# Patient Record
Sex: FEMALE | Race: OTHER
[De-identification: names, ages, dates, MRNs, and addresses within clinical notes are randomized per-mention and may not be internally consistent; named-entity substitution may affect disease eponyms.]

---

## 2018-04-30 ENCOUNTER — HOSPITAL ENCOUNTER (OUTPATIENT)
Dept: HOSPITAL 61 - ECHO | Age: 48
Discharge: HOME | End: 2018-04-30
Attending: INTERNAL MEDICINE
Payer: COMMERCIAL

## 2018-04-30 DIAGNOSIS — R07.9: Primary | ICD-10-CM

## 2018-04-30 PROCEDURE — 93017 CV STRESS TEST TRACING ONLY: CPT

## 2018-04-30 PROCEDURE — 93350 STRESS TTE ONLY: CPT

## 2018-07-03 ENCOUNTER — HOSPITAL ENCOUNTER (OUTPATIENT)
Dept: HOSPITAL 61 - MAMMO | Age: 48
Discharge: HOME | End: 2018-07-03
Attending: PHYSICIAN ASSISTANT
Payer: COMMERCIAL

## 2018-07-03 DIAGNOSIS — Z12.31: Primary | ICD-10-CM

## 2018-07-03 PROCEDURE — 77063 BREAST TOMOSYNTHESIS BI: CPT

## 2018-07-03 PROCEDURE — 77067 SCR MAMMO BI INCL CAD: CPT

## 2018-10-29 ENCOUNTER — HOSPITAL ENCOUNTER (EMERGENCY)
Dept: HOSPITAL 61 - ER | Age: 48
Discharge: HOME | End: 2018-10-29
Payer: COMMERCIAL

## 2018-10-29 VITALS — BODY MASS INDEX: 29.82 KG/M2 | WEIGHT: 190 LBS | HEIGHT: 67 IN

## 2018-10-29 VITALS — SYSTOLIC BLOOD PRESSURE: 120 MMHG | DIASTOLIC BLOOD PRESSURE: 58 MMHG

## 2018-10-29 DIAGNOSIS — R20.0: ICD-10-CM

## 2018-10-29 DIAGNOSIS — M54.2: ICD-10-CM

## 2018-10-29 DIAGNOSIS — M79.601: ICD-10-CM

## 2018-10-29 DIAGNOSIS — R42: ICD-10-CM

## 2018-10-29 DIAGNOSIS — R11.0: ICD-10-CM

## 2018-10-29 DIAGNOSIS — R51: Primary | ICD-10-CM

## 2018-10-29 DIAGNOSIS — Z88.0: ICD-10-CM

## 2018-10-29 LAB
ALBUMIN SERPL-MCNC: 3.8 G/DL (ref 3.4–5)
ALBUMIN/GLOB SERPL: 1 {RATIO} (ref 1–1.7)
ALP SERPL-CCNC: 110 U/L (ref 46–116)
ALT SERPL-CCNC: 39 U/L (ref 14–59)
ANION GAP SERPL CALC-SCNC: 10 MMOL/L (ref 6–14)
APTT PPP: YELLOW S
AST SERPL-CCNC: 24 U/L (ref 15–37)
BACTERIA #/AREA URNS HPF: (no result) /HPF
BASOPHILS # BLD AUTO: 0.1 X10^3/UL (ref 0–0.2)
BASOPHILS NFR BLD: 1 % (ref 0–3)
BILIRUB SERPL-MCNC: 0.5 MG/DL (ref 0.2–1)
BILIRUB UR QL STRIP: NEGATIVE
BUN SERPL-MCNC: 10 MG/DL (ref 7–20)
BUN/CREAT SERPL: 14 (ref 6–20)
CALCIUM SERPL-MCNC: 9.8 MG/DL (ref 8.5–10.1)
CHLORIDE SERPL-SCNC: 106 MMOL/L (ref 98–107)
CO2 SERPL-SCNC: 27 MMOL/L (ref 21–32)
CREAT SERPL-MCNC: 0.7 MG/DL (ref 0.6–1)
EOSINOPHIL NFR BLD: 0.5 X10^3/UL (ref 0–0.7)
EOSINOPHIL NFR BLD: 6 % (ref 0–3)
ERYTHROCYTE [DISTWIDTH] IN BLOOD BY AUTOMATED COUNT: 12.8 % (ref 11.5–14.5)
FIBRINOGEN PPP-MCNC: CLEAR MG/DL
GFR SERPLBLD BASED ON 1.73 SQ M-ARVRAT: 89.7 ML/MIN
GLOBULIN SER-MCNC: 3.8 G/DL (ref 2.2–3.8)
GLUCOSE SERPL-MCNC: 94 MG/DL (ref 70–99)
HCT VFR BLD CALC: 43.5 % (ref 36–47)
HGB BLD-MCNC: 15.4 G/DL (ref 12–15.5)
LYMPHOCYTES # BLD: 2.4 X10^3/UL (ref 1–4.8)
LYMPHOCYTES NFR BLD AUTO: 32 % (ref 24–48)
MCH RBC QN AUTO: 33 PG (ref 25–35)
MCHC RBC AUTO-ENTMCNC: 35 G/DL (ref 31–37)
MCV RBC AUTO: 93 FL (ref 79–100)
MONO #: 0.5 X10^3/UL (ref 0–1.1)
MONOCYTES NFR BLD: 7 % (ref 0–9)
NEUT #: 3.9 X10^3UL (ref 1.8–7.7)
NEUTROPHILS NFR BLD AUTO: 54 % (ref 31–73)
NITRITE UR QL STRIP: NEGATIVE
PH UR STRIP: 6 [PH]
PLATELET # BLD AUTO: 215 X10^3/UL (ref 140–400)
POTASSIUM SERPL-SCNC: 3.9 MMOL/L (ref 3.5–5.1)
PROT SERPL-MCNC: 7.6 G/DL (ref 6.4–8.2)
PROT UR STRIP-MCNC: NEGATIVE MG/DL
RBC # BLD AUTO: 4.7 X10^6/UL (ref 3.5–5.4)
RBC #/AREA URNS HPF: 0 /HPF (ref 0–2)
SODIUM SERPL-SCNC: 143 MMOL/L (ref 136–145)
SQUAMOUS #/AREA URNS LPF: (no result) /LPF
U PREG PATIENT: NEGATIVE
UROBILINOGEN UR-MCNC: 0.2 MG/DL
WBC # BLD AUTO: 7.3 X10^3/UL (ref 4–11)
WBC #/AREA URNS HPF: (no result) /HPF (ref 0–4)

## 2018-10-29 PROCEDURE — 93005 ELECTROCARDIOGRAM TRACING: CPT

## 2018-10-29 PROCEDURE — 85025 COMPLETE CBC W/AUTO DIFF WBC: CPT

## 2018-10-29 PROCEDURE — 87186 SC STD MICRODIL/AGAR DIL: CPT

## 2018-10-29 PROCEDURE — 96374 THER/PROPH/DIAG INJ IV PUSH: CPT

## 2018-10-29 PROCEDURE — 80053 COMPREHEN METABOLIC PANEL: CPT

## 2018-10-29 PROCEDURE — 87086 URINE CULTURE/COLONY COUNT: CPT

## 2018-10-29 PROCEDURE — 36415 COLL VENOUS BLD VENIPUNCTURE: CPT

## 2018-10-29 PROCEDURE — 70450 CT HEAD/BRAIN W/O DYE: CPT

## 2018-10-29 PROCEDURE — 81025 URINE PREGNANCY TEST: CPT

## 2018-10-29 PROCEDURE — 81001 URINALYSIS AUTO W/SCOPE: CPT

## 2018-10-29 PROCEDURE — 99285 EMERGENCY DEPT VISIT HI MDM: CPT

## 2018-10-29 NOTE — RAD
Examination: CT HEAD WO CONTRAST

 

History: headache,  right arm numbness, r/o mass/bleed

 

Comparison/Correlation: None

 

Findings: Axial images of the head were obtained without contrast. 

Ventricles are normal size. No intracranial hemorrhage, midline shift, or 

mass effect. No depressed fracture or bony destruction. Partial 

opacification of paranasal sinuses.

 

 

Impression:

No intracranial hemorrhage.

 

Electronically signed by: Daniel Masterson MD (10/29/2018 6:11 PM) [facility]

## 2018-10-29 NOTE — PHYS DOC
Past Medical History


Past Medical History:  No Pertinent History


Past Surgical History:  No Surgical History


Alcohol Use:  None


Drug Use:  None





Adult General


Chief Complaint


Chief Complaint:  DIZZY/LIGHT HEADED





[facility]


HPI





Patient is a 48  year old female who is presenting with right arm pain 

described as a numbness and sleepiness sensation to the right arm. She woke up 

with at around 3 AM she says she also has right-sided neck pain as well as a 

right-sided headache described as dull and throbbing associated with light 

sensitivity and nausea. Apparently she has had arm pain and numbness in both 

arms over the last couple years but it went away for about 6 months he just 

came back this morning. Patient does not recall any specific trauma she has not 

had vomiting no fever no chest pain. Symptoms are moderate slowly worsening 

with time





Review of Systems


Review of Systems





Constitutional: Denies fever or chills []


Eyes: Denies change in visual acuity, redness, or eye pain []


HENT: Denies nasal congestion or sore throat []


Respiratory: Denies cough or shortness of breath []


Cardiovascular: No additional information not addressed in HPI []


GI: Denies abdominal pain, nausea, vomiting, bloody stools or diarrhea []


: Denies dysuria or hematuria []


Musculoskeletal: Denies back pain or joint pain []


Integument: Denies rash or skin lesions []


Neurologic: Denies headache, focal weakness or sensory changes []


Endocrine: Denies polyuria or polydipsia []





All other systems were reviewed and found to be within normal limits, except as 

documented in this note.





Current Medications


Current Medications





Current Medications








 Medications


  (Trade)  Dose


 Ordered  Sig/Marjan  Start Time


 Stop Time Status Last Admin


Dose Admin


 


 Prochlorperazine


 Edisylate


  (Compazine)  10 mg  1X  ONCE  10/29/18 17:30


 10/29/18 17:31 DC 10/29/18 17:41


10 MG











Allergies


Allergies





Allergies








Coded Allergies Type Severity Reaction Last Updated Verified


 


  Penicillins Allergy Intermediate  11/17/14 No











Physical Exam


Physical Exam





Constitutional: Well developed, well nourished, no acute distress, non-toxic 

appearance. []


HENT: Normocephalic, atraumatic, bilateral external ears normal, oropharynx 

moist, no oral exudates, nose normal. []There is scalp tenderness noted on the 

right scalp no erythema was seen


Eyes: PERRLA, EOMI, conjunctiva normal, no discharge. [] 


Neck: There is palpable spasm and mild tenderness to palpation over the 

trapezius on the right neck. No focal midline tenderness


Cardiovascular:Heart rate regular rhythm, no murmur []


Lungs & Thorax:  Bilateral breath sounds clear to auscultation []


Abdomen: Bowel sounds normal, soft, no tenderness, no masses, no pulsatile 

masses. [] 


Skin: Warm, dry, no erythema, no rash. [] 


] 


Extremities: No tenderness, no cyanosis, no clubbing, ROM intact, no edema. [] 


Neurologic: Alert and oriented X 3, normal motor function, normal sensory 

function, no focal deficits noted. []Sensation appears intact to light touch 

finger-nose-finger is intact extraocular movements are intact there is no 

nystagmus cranial nerves are intact


Psychologic: Affect normal, judgement normal, mood normal. []








PE by Dr. Figueroa:


Constitutional: Well developed, well nourished, no acute distress, non-toxic 

appearance. []


HENT: Normocephalic, atraumatic,  oropharynx moist


Eyes: PERRL, EOMI, conjunctiva normal, no discharge. [] 


Neck:  No focal midline tenderness, mild paraspinal tenderness


Skin: Warm, dry, no erythema, no rash. [] 


Extremities: No tenderness,, ROM intact, no edema. [] 


Neurologic: Alert and oriented X 3, normal motor function, normal sensory 

function, no focal deficits noted.


Psychologic: Affect normal, judgement normal, mood normal. []





Current Patient Data


Vital Signs





 Vital Signs








  Date Time  Temp Pulse Resp B/P (MAP) Pulse Ox O2 Delivery O2 Flow Rate FiO2


 


10/29/18 18:57  68 20  95   


 


10/29/18 17:01 98.2   120/63 (82)  Room Air  





 98.2       








Lab Values





 Laboratory Tests








Test


 10/29/18


17:32 10/29/18


18:03


 


White Blood Count


 7.3 x10^3/uL


(4.0-11.0) 





 


Red Blood Count


 4.70 x10^6/uL


(3.50-5.40) 





 


Hemoglobin


 15.4 g/dL


(12.0-15.5) 





 


Hematocrit


 43.5 %


(36.0-47.0) 





 


Mean Corpuscular Volume


 93 fL ()


 





 


Mean Corpuscular Hemoglobin 33 pg (25-35)   


 


Mean Corpuscular Hemoglobin


Concent 35 g/dL


(31-37) 





 


Red Cell Distribution Width


 12.8 %


(11.5-14.5) 





 


Platelet Count


 215 x10^3/uL


(140-400) 





 


Neutrophils (%) (Auto) 54 % (31-73)   


 


Lymphocytes (%) (Auto) 32 % (24-48)   


 


Monocytes (%) (Auto) 7 % (0-9)   


 


Eosinophils (%) (Auto) 6 % (0-3)  H 


 


Basophils (%) (Auto) 1 % (0-3)   


 


Neutrophils # (Auto)


 3.9 x10^3uL


(1.8-7.7) 





 


Lymphocytes # (Auto)


 2.4 x10^3/uL


(1.0-4.8) 





 


Monocytes # (Auto)


 0.5 x10^3/uL


(0.0-1.1) 





 


Eosinophils # (Auto)


 0.5 x10^3/uL


(0.0-0.7) 





 


Basophils # (Auto)


 0.1 x10^3/uL


(0.0-0.2) 





 


Sodium Level


 143 mmol/L


(136-145) 





 


Potassium Level


 3.9 mmol/L


(3.5-5.1) 





 


Chloride Level


 106 mmol/L


() 





 


Carbon Dioxide Level


 27 mmol/L


(21-32) 





 


Anion Gap 10 (6-14)   


 


Blood Urea Nitrogen


 10 mg/dL


(7-20) 





 


Creatinine


 0.7 mg/dL


(0.6-1.0) 





 


Estimated GFR


(Cockcroft-Gault) 89.7  


 





 


BUN/Creatinine Ratio 14 (6-20)   


 


Glucose Level


 94 mg/dL


(70-99) 





 


Calcium Level


 9.8 mg/dL


(8.5-10.1) 





 


Total Bilirubin


 0.5 mg/dL


(0.2-1.0) 





 


Aspartate Amino Transferase


(AST) 24 U/L (15-37)


 





 


Alanine Aminotransferase (ALT)


 39 U/L (14-59)


 





 


Alkaline Phosphatase


 110 U/L


() 





 


Total Protein


 7.6 g/dL


(6.4-8.2) 





 


Albumin


 3.8 g/dL


(3.4-5.0) 





 


Albumin/Globulin Ratio 1.0 (1.0-1.7)   


 


Urine Collection Type  Void  


 


Urine Color  Yellow  


 


Urine Clarity  Clear  


 


Urine pH  6.0  


 


Urine Specific Gravity  1.020  


 


Urine Protein


 


 Negative mg/dL


(NEG-TRACE)


 


Urine Glucose (UA)


 


 Negative mg/dL


(NEG)


 


Urine Ketones (Stick)


 


 Negative mg/dL


(NEG)


 


Urine Blood


 


 Negative (NEG)





 


Urine Nitrite


 


 Negative (NEG)





 


Urine Bilirubin


 


 Negative (NEG)





 


Urine Urobilinogen Dipstick


 


 0.2 mg/dL (0.2


mg/dL)


 


Urine Leukocyte Esterase


 


 Negative (NEG)





 


Urine RBC  0 /HPF (0-2)  


 


Urine WBC


 


 5-10 /HPF


(0-4)


 


Urine Squamous Epithelial


Cells 


 Few /LPF  





 


Urine Bacteria


 


 Moderate /HPF


(0-FEW)


 


Urine Mucus  Slight /LPF  


 


Urine Pregnancy Test


 


 Negative (NEG)








 Laboratory Tests


10/29/18 17:32








 Laboratory Tests


10/29/18 17:32











EKG


EKG


[]


Interpretation Time:


EKG shows a normal sinus rhythm with a rate of 77 there are no acute ischemic 

changes noted there are some nonspecific flattening anteriorly but no evidence 

of ischemia seen. Interpreted by me time of encounter





Radiology/Procedures


Radiology/Procedures


PROCEDURE: CT HEAD WO CONTRAST





Examination: CT HEAD WO CONTRAST


 


History: headache,  right arm numbness, r/o mass/bleed


 


Comparison/Correlation: None


 


Findings: Axial images of the head were obtained without contrast. 


Ventricles are normal size. No intracranial hemorrhage, midline shift, or 


mass effect. No depressed fracture or bony destruction. Partial 


opacification of paranasal sinuses.


 


 


Impression:


No intracranial hemorrhage.


 


Electronically signed by: Daniel Masterson MD (10/29/2018 6:11 PM) [facility]





Course & Med Decision Making


Course & Med Decision Making


Pertinent Labs and Imaging studies reviewed. (See chart for details)





[]This is a 48-year-old female with no significant past medical history was 

presenting with right arm pain and numbness associated with some right neck 

pain and numbness and also a right headache. Differential would include 

migraine. Patient has had these symptoms in the past and according to her 

system has never had brain imaging and so I ordered a CT noncom to rule out a 

mass much less likely a bleed this does not sound like a subarachnoid 

hemorrhage. Patient is neurologically intact on physical examination , I really 

am not concerned about a vascular emergency either. We'll try Compazine , try 

to improve her headache check basic labs CT head and go from there care will be 

signed over to Dr. Figueroa at 6 PM pending completion of workup and final 

disposition.








[place]:  Sign out received from Dr. Byers for patient with report of headache 

and right arm pain/numbness with neck pain.  Headache previously addressed.  

Labs and CT head pending.  Labs reviewed.  UA appears more likely contamination 

vs focal infection.  CT head without acute process.  Patient seen and evaluated 

by myself.  NIHSS 0.  Patient reports interval improvement of symptoms. Patient 

stable for discharge home with outpatient follow-up with PCP.  Discussed 

findings and plan with patient and family, who acknowledge understanding and 

agreement.





Dragon Disclaimer


Dragon Disclaimer


This electronic medical record was generated, in whole or in part, using a 

voice recognition dictation system.





Departure


Departure


Impression:  


 Primary Impression:  


 Headache


 Additional Impression:  


 Neck pain


Disposition:  01 HOME, SELF-CARE


Condition:  IMPROVED


Referrals:  


ANNE BLUNT (PCP)








GERA CORBETT MD


Patient Instructions:  Cervical Radiculopathy, Easy-to-Read, Headache, FAQs


Scripts


Orphenadrine Citrate (ORPHENADRINE CITRATE) 100 Mg Tablet.er


100 MG PO BID PRN for MUSCLE PAIN, #14


   Prov: FRANSISCA FIGUEROA DO         10/29/18 


Butalb/Acetaminophen/Caffeine (KBLSKP-[id number]-EELH -40) 1 Each Tablet


1 EACH PO Q6HRS PRN for HEADACHE, #10 TAB


   Prov: FRANSISCA FIGUEROA DO         10/29/18





NIHSS Stroke Scale


NIH Stroke Scale:  








NIH Stroke Scale Response (Comments) Value


 


Level of Consciousness:  0 Alert/Responsive 0


 


LOC Questions:  0 Answers both correctly 0


 


LOC Commands:  0 Performs both tasks 0


 


Best Gaze:  0 Normal 0


 


Visual:  0 No visual loss 0


 


Facial Palsy:  0 Normal, symmetrical 0


 


Motor - Left Arm  0 No drift 0


 


Motor - Right Arm  0 No drift 0


 


Motor - Left Leg  0 No drift 0


 


Motor: Right Leg  0 No drift 0


 


Limb Ataxia:  0 Absent 0


 


Sensory:  0 No loss 0


 


Best Language:  0 Normal 0


 


Dysathria:  0 Normal 0


 


Extinction and Inattention:  0 Normal 0


 


Total  0











Problem Qualifiers








 Primary Impression:  


 Headache


 Headache type:  unspecified  Headache chronicity pattern:  unspecified pattern

  Intractability:  not intractable  Qualified Codes:  R51 - Headache








JULIO BYERS MD Oct 29, 2018 17:50


FRANSISCA FIGUEROA DO Oct 29, 2018 19:14

## 2018-10-30 NOTE — EKG
[facility]

              [address] [place] [address]

Test Date:    2018-10-29               Test Time:    17:01:01

Pat Name:     MARCIE FRANZ             Department:   

Patient ID:   [id number]           Room:          

Gender:       F                        Technician:   

:          1970               Requested By: JULIO MENDOZA

Order Number: [id number]           Reading MD:   Arnulfo Norris MD

                                 Measurements

Intervals                              Axis          

Rate:         77                       P:            52

KS:           156                      QRS:          66

QRSD:         92                       T:            32

QT:           376                                    

QTc:          427                                    

                           Interpretive Statements

SINUS RHYTHM



Electronically Signed On 2018 11:50:39 CDT by Arnulfo Norris MD

## 2019-04-03 ENCOUNTER — HOSPITAL ENCOUNTER (EMERGENCY)
Dept: HOSPITAL 61 - ER | Age: 49
Discharge: HOME | End: 2019-04-03
Payer: COMMERCIAL

## 2019-04-03 VITALS — SYSTOLIC BLOOD PRESSURE: 121 MMHG | DIASTOLIC BLOOD PRESSURE: 63 MMHG

## 2019-04-03 VITALS — WEIGHT: 185 LBS | HEIGHT: 66 IN | BODY MASS INDEX: 29.73 KG/M2

## 2019-04-03 DIAGNOSIS — E03.9: ICD-10-CM

## 2019-04-03 DIAGNOSIS — F32.9: ICD-10-CM

## 2019-04-03 DIAGNOSIS — Z88.0: ICD-10-CM

## 2019-04-03 DIAGNOSIS — G43.001: Primary | ICD-10-CM

## 2019-04-03 PROCEDURE — 70450 CT HEAD/BRAIN W/O DYE: CPT

## 2019-04-03 PROCEDURE — 81025 URINE PREGNANCY TEST: CPT

## 2019-04-03 PROCEDURE — 99284 EMERGENCY DEPT VISIT MOD MDM: CPT

## 2019-04-03 PROCEDURE — 96374 THER/PROPH/DIAG INJ IV PUSH: CPT

## 2019-04-03 PROCEDURE — 96375 TX/PRO/DX INJ NEW DRUG ADDON: CPT
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
## 2019-04-03 NOTE — RAD
CT HEAD

 

INDICATION: severe headache, prior sent

 

COMPARISON: 10/29/2018

 

Exposure: One or more of the following individualized dose reduction 

techniques were utilized for this examination:  1. Automated exposure 

control  2. Adjustment of the mA and/or kV according to patient size  3. 

Use of iterative reconstruction technique

 

TECHNIQUE: 5 mm contiguous axial images were obtained from the skull base 

to the vertex in both bone and soft tissue algorithm.  

 

FINDINGS: 

No abnormal attenuation within the brain parenchyma.  

 

No evidence of acute intracranial hemorrhage.   No extra-axial fluid 

collections.

 

No mass effect or midline shift. Ventricular size is appropriate.  Basal 

cisterns are patent.

 

No fractures identified.Gray-white differentiation is preserved.Globes and

orbits are within normal limits.   Paranasal sinuses and mastoid air cells

are clear.   

 

IMPRESSION:

 

No acute intracranial findings. 

 

Electronically signed by: Lio Day MD (4/3/2019 10:12 PM) [facility]

## 2019-04-10 ENCOUNTER — HOSPITAL ENCOUNTER (EMERGENCY)
Dept: HOSPITAL 61 - ER | Age: 49
Discharge: HOME | End: 2019-04-10
Payer: COMMERCIAL

## 2019-04-10 VITALS — SYSTOLIC BLOOD PRESSURE: 108 MMHG | DIASTOLIC BLOOD PRESSURE: 59 MMHG

## 2019-04-10 VITALS — HEIGHT: 64 IN | BODY MASS INDEX: 29.02 KG/M2 | WEIGHT: 170 LBS

## 2019-04-10 DIAGNOSIS — E03.9: ICD-10-CM

## 2019-04-10 DIAGNOSIS — Z88.0: ICD-10-CM

## 2019-04-10 DIAGNOSIS — N30.00: Primary | ICD-10-CM

## 2019-04-10 DIAGNOSIS — Z98.890: ICD-10-CM

## 2019-04-10 DIAGNOSIS — F32.9: ICD-10-CM

## 2019-04-10 LAB
APTT PPP: (no result) S
BACTERIA #/AREA URNS HPF: (no result) /HPF
BILIRUB UR QL STRIP: (no result)
FIBRINOGEN PPP-MCNC: (no result) MG/DL
NITRITE UR QL STRIP: (no result)
PH UR STRIP: (no result) [PH]
PROT UR STRIP-MCNC: (no result) MG/DL
RBC #/AREA URNS HPF: 0 /HPF (ref 0–2)
SQUAMOUS #/AREA URNS LPF: (no result) /LPF
UROBILINOGEN UR-MCNC: (no result) MG/DL
WBC #/AREA URNS HPF: (no result) /HPF (ref 0–4)

## 2019-04-10 PROCEDURE — 99283 EMERGENCY DEPT VISIT LOW MDM: CPT

## 2019-04-10 PROCEDURE — 87086 URINE CULTURE/COLONY COUNT: CPT

## 2019-04-10 PROCEDURE — 87186 SC STD MICRODIL/AGAR DIL: CPT

## 2019-04-10 PROCEDURE — 81001 URINALYSIS AUTO W/SCOPE: CPT

## 2019-04-10 NOTE — PHYS DOC
Past Medical History


Past Medical History:  Depression, Hypothyroid


Past Surgical History:  


Alcohol Use:  None


Drug Use:  None





Adult General


Chief Complaint


Chief Complaint:  PAIN ON URINATION





HPI


HPI





Patient is a 49  year old female who presents with complaining of urinary 

frequency and dysuria for the last 3 days with suprapubic and right flank pain 

that did not get better with taking over-the-counter 8. Patient denies history 

of recent dehydration or UTI and vaginal discharge and bleeding. Patient denies 

fever and chills, nausea and vomiting, history of frequent UTI.





Review of Systems


Review of Systems





Constitutional: Denies fever or chills []


Eyes: Denies change in visual acuity, redness, or eye pain []


HENT: Denies nasal congestion or sore throat []


Respiratory: Denies cough or shortness of breath []


Cardiovascular: No additional information not addressed in HPI []


GI: Reports lower abdominal pain, denies nausea, vomiting, bloody stools or 

diarrhea []


: Reports dysuria, denies hematuria


Musculoskeletal: Denies back pain or joint pain []


Integument: Denies rash or skin lesions []


Neurologic: Denies headache, focal weakness or sensory changes []


Endocrine: Denies polyuria or polydipsia []





All other systems were reviewed and found to be within normal limits, except as 

documented in this note.





Current Medications


Current Medications





Current Medications








 Medications


  (Trade)  Dose


 Ordered  Sig/Marjan  Start Time


 Stop Time Status Last Admin


Dose Admin


 


 Acetaminophen/


 Hydrocodone Bitart


  (Lortab 5/325)  1 tab  1X  ONCE  4/10/19 12:15


 4/10/19 12:16 DC 4/10/19 12:16


1 TAB











Allergies


Allergies





Allergies








Coded Allergies Type Severity Reaction Last Updated Verified


 


  Penicillins Allergy Intermediate  14 No











Physical Exam


Physical Exam





Constitutional: Well developed, well nourished, mild distress, non-toxic 

appearance. []


HENT: Normocephalic, atraumatic. []


Eyes: PERRLA, EOMI, conjunctiva normal, no discharge. [] 


Neck: Normal range of motion, no tenderness, supple, no stridor. [] 


Cardiovascular:Heart rate regular rhythm, no murmur []


Lungs & Thorax:  Bilateral breath sounds clear to auscultation []


Abdomen: Bowel sounds normal, soft, no tenderness, no masses, no pulsatile 

masses. [] 


Skin: Warm, dry, no erythema, no rash. [] 


Back: No tenderness, no CVA tenderness. [] 


Extremities: No tenderness, no cyanosis, no clubbing, ROM intact, no edema. [] 


Neurologic: Alert and oriented X 3, normal motor function, normal sensory 

function, no focal deficits noted. []


Psychologic: Affect anxious, judgement normal, mood normal. []





Current Patient Data


Vital Signs





 Vital Signs








  Date Time  Temp Pulse Resp B/P (MAP) Pulse Ox O2 Delivery O2 Flow Rate FiO2


 


4/10/19 13:14      Room Air  


 


4/10/19 12:16   16  98   


 


4/10/19 11:49 99.1 97  108/59 (75)    





 99.1       








Lab Values





 Laboratory Tests








Test


 4/10/19


11:35


 


Urine Collection Type Unknown  


 


Urine Color Orange  


 


Urine Clarity Cloudy  


 


Urine pH   


 


Urine Specific Gravity   


 


Urine Protein


 mg/dL


(NEG-TRACE)


 


Urine Glucose (UA)  mg/dL (NEG)  


 


Urine Ketones (Stick)  mg/dL (NEG)  


 


Urine Blood  (NEG)  


 


Urine Nitrite  (NEG)  


 


Urine Bilirubin  (NEG)  


 


Urine Urobilinogen Dipstick


 mg/dL (0.2


mg/dL)


 


Urine Leukocyte Esterase  (NEG)  


 


Urine RBC 0 /HPF (0-2)  


 


Urine WBC


 Tntc /HPF


(0-4)


 


Urine Squamous Epithelial


Cells Few /LPF  





 


Urine Bacteria


 Moderate /HPF


(0-FEW)


 


Urine Mucus Mod /LPF  











EKG


EKG


[]





Radiology/Procedures


Radiology/Procedures


[]





Course & Med Decision Making


Course & Med Decision Making


Pertinent Labs  reviewed. (See chart for details)





discharge:





I've spoken with the patient and/or caregivers. I've explained the patient's 

condition, diagnosis and treatment plan based on information available to me at 

this time. I've answered the patient's and/or caregivers questions and 

addressed any concerns. The patient and/or caregivers have a good understanding 

the patient's diagnosis, condition and treatment plan as can be expected at 

this point. Vital signs have been stabilized. The patient's condition is stable 

for discharge from the emergency department.





The patient will pursue further outpatient evaluation with her primary care 

provider or other designated consulting physician as outlined in the discharge 

instructions. Patient and/or caregivers are agreeable to this plan of care and 

follow-up instructions have been explained in detail. The patient and/or 

caregivers have received these instructions in written format and expressed 

understanding of these discharge instructions. The patient and her caregivers 

are aware that if any significant change in condition or worsening of symptoms 

should prompt him to immediately return to this of the closest emergency 

department.  If an emergent department is not readily available I would 

encourage him to call 911.





Leslye Disclaimer


Dragon Disclaimer


This electronic medical record was generated, in whole or in part, using a 

voice recognition dictation system.





Departure


Departure


Impression:  


 Primary Impression:  


 Acute cystitis


Disposition:   HOME, SELF-CARE (at 1306)


Condition:  IMPROVED


Referrals:  


ANNE BLUNT (PCP)


Patient Instructions:  Urinary Tract Infection





Additional Instructions:  


Drink plenty of liquids


Follow-up with your primary care physician in 3-5 days


Return to ER if not getting better


Scripts


Phenazopyridine Hcl (PYRIDIUM) 100 Mg Tablet


100 MG PO TID for dysuria, #10 TAB


   Prov: DYLAN SIERRA MD         4/10/19 


Hydrocodone/Apap 5-325 (NORCO 5-325 TABLET) 1 Each Tablet


1 TAB PO PRN Q6HRS PRN for PAIN, #14 TAB 0 Refills


   Prov: DYLAN SIERRA MD         4/10/19 


Sulfamethoxazole/Trimethoprim (BACTRIM DS TABLET) 1 Each Tablet


1 TAB PO BID for infection, #14 TAB


   Prov: DYLAN SIERRA MD         4/10/19











DYLAN SIERRA MD Apr 10, 2019 13:10

## 2019-04-12 ENCOUNTER — HOSPITAL ENCOUNTER (EMERGENCY)
Dept: HOSPITAL 61 - ER | Age: 49
Discharge: HOME | End: 2019-04-12
Payer: COMMERCIAL

## 2019-04-12 VITALS — DIASTOLIC BLOOD PRESSURE: 55 MMHG | SYSTOLIC BLOOD PRESSURE: 109 MMHG

## 2019-04-12 VITALS — HEIGHT: 64 IN | WEIGHT: 170 LBS | BODY MASS INDEX: 29.02 KG/M2

## 2019-04-12 DIAGNOSIS — Z88.0: ICD-10-CM

## 2019-04-12 DIAGNOSIS — T40.2X5A: ICD-10-CM

## 2019-04-12 DIAGNOSIS — T37.0X5A: ICD-10-CM

## 2019-04-12 DIAGNOSIS — N39.0: ICD-10-CM

## 2019-04-12 DIAGNOSIS — F32.9: ICD-10-CM

## 2019-04-12 DIAGNOSIS — E03.9: ICD-10-CM
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
## 2019-04-12 DIAGNOSIS — L27.0: Primary | ICD-10-CM

## 2019-04-12 DIAGNOSIS — T39.8X5A: ICD-10-CM

## 2019-04-12 DIAGNOSIS — Z98.890: ICD-10-CM

## 2019-04-12 PROCEDURE — 99283 EMERGENCY DEPT VISIT LOW MDM: CPT

## 2019-04-12 NOTE — PHYS DOC
Past Medical History


Past Medical History:  Depression, Hypothyroid


Past Surgical History:  


Alcohol Use:  None


Drug Use:  None





Adult General


Chief Complaint


Chief Complaint:  ALLERGIC REACTION





[facility] [place]


HPI





Patient is a 49  year old female who presents to be evaluated for an allergic 

reaction, patient was started on Bactrim, Pyridium, hydrocodone 2 days ago, she 

was being treated for UTI. She started taking the medicine and developed a rash 

yesterday. The rash is mostly on upper extremities, abdomen and back. Patient 

denies any anaphylactic type reactions symptoms. Denies any fever nausea/ 

vomiting.





Review of Systems


Review of Systems





Constitutional: Denies fever or chills []


Eyes: Denies change in visual acuity, redness, or eye pain []


HENT: Denies nasal congestion or sore throat []


Respiratory: Denies cough or shortness of breath []


Cardiovascular: No additional information not addressed in HPI []


GI: Denies abdominal pain, nausea, vomiting, bloody stools or diarrhea []


: Denies dysuria or hematuria []


Musculoskeletal: Denies back pain or joint pain []


Integument: Reports rash


Neurologic: Denies headache, focal weakness or sensory changes []








All other systems were reviewed and found to be within normal limits, except as 

documented in this note.





Allergies


Allergies





Allergies








Coded Allergies Type Severity Reaction Last Updated Verified


 


  Penicillins Allergy Intermediate  14 No











Physical Exam


Physical Exam





Constitutional: Well developed, well nourished, no acute distress, non-toxic 

appearance. []


HENT: Normocephalic, atraumatic, bilateral external ears normal, oropharynx 

moist, no oral exudates, nose normal. []


Eyes: PERRLA, EOMI, conjunctiva normal, no discharge. [] 


Neck: Normal range of motion, no tenderness, supple, no stridor. [] 


Cardiovascular:Heart rate regular rhythm, no murmur []


Lungs & Thorax:  Bilateral breath sounds clear to auscultation []


Abdomen: Bowel sounds normal, soft, no tenderness, no masses, no pulsatile 

masses. [] 


Skin: Warm, dry, mild amount of erythema is papular rash in patient's abdomen, 

back, small amount of the same rash on the lateral upper extremities, trace 

amount on the right lower extremity. Patient is actively itching.


Back: No tenderness, no CVA tenderness. [] 


Extremities: No tenderness, no cyanosis, no clubbing, ROM intact, no edema. [] 


Neurologic: Alert and oriented X 3, normal motor function, normal sensory 

function, no focal deficits noted. []


Psychologic: Affect normal, judgement normal, mood normal. []





Current Patient Data


Vital Signs





 Vital Signs








  Date Time  Temp Pulse Resp B/P (MAP) Pulse Ox O2 Delivery O2 Flow Rate FiO2


 


19 11:19 98.6 90 18 109/55 (73) 94 Room Air  





 98.6       











EKG


EKG


[]





Radiology/Procedures


Radiology/Procedures


[]





Course & Med Decision Making


Course & Med Decision Making


Pertinent Labs and Imaging studies reviewed. (See chart for details)





This is a 49-year-old female patient who presents to the ED today with allergic 

reaction to medications. Patient was started on Bactrim, Pyridium, and 

hydrocodone 2 days ago. She is being treated for UTI. She is non-anaphylactic 

reaction type symptoms. She was instructed to stop taking the Bactrim and 

Pyridium and hydrocodone. She was started on MicroBid. She was put on prednisone

, Ibuprofen for pain and Benadryl. Follow-up with primary care doctor in 1 week.





Dragon Disclaimer


Dragon Disclaimer


This electronic medical record was generated, in whole or in part, using a 

voice recognition dictation system.





Departure


Departure


Impression:  


 Primary Impression:  


 UTI (urinary tract infection)


 Additional Impression:  


 Allergic reaction to drug


Disposition:  01 HOME, SELF-CARE


Condition:  STABLE


Referrals:  


ANNE BLUNT (PCP)


follow up in 1 week


Patient Instructions:  Drug Allergy, Urinary Tract Infection





Additional Instructions:  


You were evaluated in the emergency room for an allergic reaction, stop taking 

the hydrocodone Bactrim and Pyridium. You can take ibuprofen for pain. Take the 

prescribed MicroBid for UTI. You can take ibuprofen for pain. Take Benadryl as 

needed for the rash. Follow-up with your doctor in 1-2 weeks


Scripts


Diphenhydramine Hcl (BENADRYL) 25 Mg Capsule


1 CAP PO Q6-8HRS PRN for RASH, #30 CAP 1 Refill


   Prov: MUTUNGA,VIVIANA APRN         19 


Famotidine (FAMOTIDINE) 20 Mg Tablet


20 MG PO DAILY, #7 TAB


   Prov: MUTUNGA,VIVIANA APRN         19 


Prednisone (PREDNISONE) 50 Mg Tablet


1 TAB PO DAILY, #5 TAB


   Prov: MUTUNGA,VIVIANA APRN         19 


Nitrofurantoin Macrocrystal (MACRODANTIN) 100 Mg Capsule


1 CAP PO BID, #14 CAP


   Prov: VIVIANA CERDA         19





Problem Qualifiers








 Primary Impression:  


 UTI (urinary tract infection)


 Urinary tract infection type:  site unspecified  Hematuria presence:  without 

hematuria  Qualified Codes:  N39.0 - Urinary tract infection, site not specified


 Additional Impression:  


 Allergic reaction to drug


 Encounter type:  initial encounter  Qualified Codes:  T78.40XA - Allergy, 

unspecified, initial encounter








VIVIANA CERDA 2019 11:44

## 2019-06-03 ENCOUNTER — HOSPITAL ENCOUNTER (EMERGENCY)
Dept: HOSPITAL 61 - ER | Age: 49
Discharge: HOME | End: 2019-06-03
Payer: COMMERCIAL

## 2019-06-03 VITALS — WEIGHT: 170 LBS | BODY MASS INDEX: 28.32 KG/M2 | HEIGHT: 65 IN

## 2019-06-03 VITALS — DIASTOLIC BLOOD PRESSURE: 63 MMHG | SYSTOLIC BLOOD PRESSURE: 121 MMHG

## 2019-06-03 DIAGNOSIS — H66.93: ICD-10-CM

## 2019-06-03 DIAGNOSIS — Z88.0: ICD-10-CM

## 2019-06-03 DIAGNOSIS — J03.90: Primary | ICD-10-CM

## 2019-06-03 PROCEDURE — 99283 EMERGENCY DEPT VISIT LOW MDM: CPT

## 2019-06-03 PROCEDURE — 87070 CULTURE OTHR SPECIMN AEROBIC: CPT

## 2019-06-03 PROCEDURE — 87880 STREP A ASSAY W/OPTIC: CPT

## 2019-06-03 NOTE — PHYS DOC
Past Medical History


Past Medical History:  No Pertinent History


 (PERRY COOLEY)


Past Surgical History:  No Surgical History


 (PERRY COOLEY)


Alcohol Use:  None


Drug Use:  None


 (PERRY COOLEY)





Adult General


Chief Complaint


Chief Complaint:  SORE THROAT





HPI


HPI





Patient is a 49  year old F who is here with 2 d of sore throat, B ear pain, 

cough and fever. 


 (PERRY COOLEY)





Review of Systems


Review of Systems





Constitutional: Reports fever.


HENT: Reports nasal congestion, sore throat and ear pain. 


Respiratory: Denies cough or shortness of breath 


Cardiovascular: Denies chest pain


GI: Denies abdominal pain, nausea, vomiting, bloody stools or diarrhea 


Musculoskeletal: Denies back pain or joint pain. Reports myalgias


Integument: Denies rash or skin lesions 


Neurologic: Denies headache, focal weakness or sensory changes 








All other systems were reviewed and found to be within normal limits, except as 

documented in this note.


 (PERRY COOLEY)





Current Medications


Current Medications





Current Medications








 Medications


  (Trade)  Dose


 Ordered  Sig/Marjan  Start Time


 Stop Time Status Last Admin


Dose Admin


 


 Acetaminophen


  (Tylenol)  650 mg  1X  ONCE  6/3/19 23:00


 6/3/19 23:01 DC 6/3/19 23:02


650 MG





 (JULIO MENDOZA MD)





Allergies


Allergies





Allergies








Coded Allergies Type Severity Reaction Last Updated Verified


 


  Penicillins Allergy Intermediate  11/17/14 No





 (JULIO MENDOZA MD)





Physical Exam


Physical Exam





Constitutional: Well developed, well nourished, no acute distress, non-toxic 

appearance. 


HENT: Normocephalic, atraumatic, bilateral TM are erythematous, oropharynx moist

with R tonsillar exudate, nose normal.


Neck: Normal range of motion, no tenderness, supple, no stridor. 


Cardiovascular:Heart rate regular rhythm, no murmur 


Lungs & Thorax:  Bilateral breath sounds clear to auscultation 


Abdomen: Bowel sounds normal, soft, no tenderness, no masses, no pulsatile 

masses.  


Skin: Warm, dry, no erythema, no rash. 


Back: No tenderness, no CVA tenderness.  


Extremities: No tenderness, no cyanosis, no clubbing, ROM intact, no edema.  


Neurologic: Alert and oriented X 3, normal motor function, normal sensory 

function, no focal deficits noted. 


Psychologic: Affect normal, judgement normal, mood normal. 


 (PERRY COOLEY)





Current Patient Data


Vital Signs





                                   Vital Signs








  Date Time  Temp Pulse Resp B/P (MAP) Pulse Ox O2 Delivery O2 Flow Rate FiO2


 


6/3/19 22:25 102.0 68 17 121/63 (82) 99 Room Air  





 102.0       





 (JULIO MENDOZA MD)


Lab Values





                                Laboratory Tests








Test


 6/3/19


22:32


 


Group A Streptococcus Rapid


 Negative


(NEGATIVE)








Microbiology


6/3/19 Throat Culture - Final, Complete


         


6/3/19 - Final, Complete


         


6/3/19 - Final, Complete


         


 (JULIO MENDOZA MD)


Lab Values





                                Laboratory Tests








Test


 6/3/19


22:32


 


Group A Streptococcus Rapid


 Negative


(NEGATIVE)








 (PERRY COOLEY)





EKG


EKG


[]


 (PERRY COOLEY)





Radiology/Procedures


Radiology/Procedures


[]


 (PERRY COOLEY)





Course & Med Decision Making


Course & Med Decision Making


Pertinent Labs and Imaging studies reviewed. (See chart for details)





Rapid strep is negative. Pt has exudative tonsillitis but without any sign of 

peritonsillar abscess. Pt has B otitis media as well. Fever here in ER despite 

Ibuprofen at home. Tylenol given here. She looks like she does not feel well but

 does not appear toxic.  Will cover with antibiotics and have recommended to 

push fluids, treat fever and follow up closely with PCP. 


 (PERRY COOLEY)


Course & Med Decision Making





Staff Physician Addendum:


I was working in the ER during the course of this patient's visit.  I was 

available for consultation as needed, but I was not directly involved in the 

care of this patient.    


 (JULIO MENDOZA MD)


Dragon Disclaimer


Dragon Disclaimer


This electronic medical record was generated, in whole or in part, using a voice

 recognition dictation system.


 (PERRY COOLEY)





Departure


Departure


Impression:  


   Primary Impression:  


   Exudative tonsillitis


   Additional Impression:  


   Otitis media


Disposition:  01 HOME, SELF-CARE


Condition:  STABLE


Referrals:  


ANNE BLUNT (PCP)


Patient Instructions:  Otitis Media, Adult, Easy-to-Read, Tonsillectomy, 

Information Before and After





Additional Instructions:  


Treat fever with ibuprofen or tylenol. 





Gargle with salt water. 





Rest, push fluids.


Scripts


Acetaminophen With Codeine (TYLENOL WITH CODEINE #3 TABLET) 1 Each Tablet


1 TAB PO PRN Q6HRS PRN for PAIN, #15 TAB


   Prov: PERRY COOLEY         6/3/19 


Cephalexin (KEFLEX) 500 Mg Capsule


1 CAP PO TID, #30 CAP


   Prov: PERRY COOLEY         6/3/19





Problem Qualifiers











PERRY COOLEY       Vito 3, 2019 22:59


JULIO MENDOZA MD             Jun 9, 2019 07:47

## 2019-08-18 ENCOUNTER — HOSPITAL ENCOUNTER (EMERGENCY)
Dept: HOSPITAL 61 - ER | Age: 49
Discharge: HOME | End: 2019-08-18
Payer: COMMERCIAL

## 2019-08-18 VITALS — SYSTOLIC BLOOD PRESSURE: 93 MMHG | DIASTOLIC BLOOD PRESSURE: 61 MMHG

## 2019-08-18 VITALS — WEIGHT: 180 LBS | BODY MASS INDEX: 28.93 KG/M2 | HEIGHT: 66 IN

## 2019-08-18 DIAGNOSIS — F41.9: ICD-10-CM

## 2019-08-18 DIAGNOSIS — F32.9: ICD-10-CM

## 2019-08-18 DIAGNOSIS — Z88.0: ICD-10-CM

## 2019-08-18 DIAGNOSIS — T78.40XA: Primary | ICD-10-CM
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
## 2019-08-18 DIAGNOSIS — Z98.890: ICD-10-CM

## 2019-08-18 PROCEDURE — 99283 EMERGENCY DEPT VISIT LOW MDM: CPT

## 2019-08-18 PROCEDURE — 96372 THER/PROPH/DIAG INJ SC/IM: CPT

## 2019-08-18 NOTE — PHYS DOC
Past Medical History


Past Medical History:  Anxiety, Depression


Past Surgical History:  


Alcohol Use:  None


Drug Use:  None





Adult General


Chief Complaint


Chief Complaint:  SKIN RASH/ABSCESS





HPI


HPI





Patient is a 49  year old female who presents with complaining of rash. Patient 

complaining of pruritic rash since last night in belt line area with few area of

rash trunk and upper and lower extremities without using new medication, food or

detergent. Patient states she had a drug allergic rash several years ago but 

does not remember height was closed. Patient denies shortness of breath, throat 

swelling, fever and chills, sick contact, nausea and vomiting, pregnancy.





Review of Systems


Review of Systems





Constitutional: Denies fever or chills []


Eyes: Denies change in visual acuity, redness, or eye pain []


HENT: Denies nasal congestion or sore throat []


Respiratory: Denies cough or shortness of breath []


Cardiovascular: No additional information not addressed in HPI []


GI: Denies abdominal pain, nausea, vomiting, bloody stools or diarrhea []


: Denies dysuria or hematuria []


Musculoskeletal: Denies back pain or joint pain []


Integument: Reports rash


Neurologic: Denies headache, focal weakness or sensory changes []


Endocrine: Denies polyuria or polydipsia []





All other systems were reviewed and found to be within normal limits, except as 

documented in this note.





Current Medications


Current Medications





Current Medications








 Medications


  (Trade)  Dose


 Ordered  Sig/Marjan  Start Time


 Stop Time Status Last Admin


Dose Admin


 


 Famotidine


  (Pepcid)  20 mg  1X  ONCE  19 06:30


 19 06:31 DC 19 06:32


20 MG


 


 Hydroxyzine HCl


  (Atarax)  25 mg  1X  ONCE  19 06:45


 19 06:45 DC 19 06:32


25 MG


 


 Methylprednisolone


 Sodium Succinate


  (SOLU-Medrol


 125MG VIAL)  125 mg  1X  ONCE  19 06:30


 19 06:31 DC 19 06:32


125 MG











Allergies


Allergies





Allergies








Coded Allergies Type Severity Reaction Last Updated Verified


 


  Penicillins Allergy Intermediate  14 No











Physical Exam


Physical Exam








Constitutional: Well developed, well nourished, mild distress, non-toxic 

appearance. []


HENT: Normocephalic, atraumatic.


Eyes: PERRLA, EOMI, conjunctiva normal, no discharge. [] 


Neck: Normal range of motion, no tenderness, supple, no stridor. [] 


Cardiovascular:Heart rate regular rhythm, no murmur []


Lungs & Thorax:  Bilateral breath sounds clear to auscultation []


Skin: Warm, dry, erythematous area of rash on belt line area with patchy heaves 

on upper and lower extremities


Back: No tenderness, no CVA tenderness. [] 


Extremities: No tenderness, no cyanosis, no clubbing, ROM intact, no edema. [] 


Neurologic: Alert and oriented X 3, no focal deficits noted. []


Psychologic: Affect normal, judgement normal, mood normal. []





Current Patient Data


Vital Signs





                                   Vital Signs








  Date Time  Temp Pulse Resp B/P (MAP) Pulse Ox O2 Delivery O2 Flow Rate FiO2


 


19 05:45 97.9 73 15 93/61 (72) 98 Room Air  





 97.9       











EKG


EKG


[]





Radiology/Procedures


Radiology/Procedures


[]





Course & Med Decision Making


Course & Med Decision Making


Evaluation of patient in ER showed 49-year-old female patient presented to ER 

with pruritic rash since last night without a cause. Patient treated with Solu-

Medrol, Pepcid and hydroxyzine ER. Plan discharge patient home with diagnosis of

 allergic rash.


I've spoken with the patient and/or caregivers. I've explained the patient's 

condition, diagnosis and treatment plan based on information available to me at 

this time. I've answered the patient's and/or caregivers questions and addressed

 any concerns. The patient and/or caregivers have a good understanding the 

patient's diagnosis, condition and treatment plan as can be expected at this 

point. Vital signs have been stabilized. The patient's condition is stable for 

discharge from the emergency department.





The patient will pursue further outpatient evaluation with her primary care 

provider or other designated consulting physician as outlined in the discharge 

instructions. Patient and/or caregivers are agreeable to this plan of care and 

follow-up instructions have been explained in detail. The patient and/or 

caregivers have received these instructions in written format and expressed 

understanding of these discharge instructions. The patient and her caregivers 

are aware that if any significant change in condition or worsening of symptoms 

should prompt him to immediately return to this of the closest emergency 

department.  If an emergent department is not readily available I would 

encourage him to call 911.





Leslye Disclaimer


Dragon Disclaimer


This electronic medical record was generated, in whole or in part, using a voice

 recognition dictation system.





Departure


Departure


Impression:  


   Primary Impression:  


   Rash due to allergy


Disposition:   HOME, SELF-CARE (at 0630)


Condition:  STABLE


Referrals:  


ANNE BLUNT (PCP)


Patient Instructions:  Rash





Additional Instructions:  


Drink plenty of liquids


Follow-up with your primary care physician in 3-5 days


Return to ER if not getting better


Scripts


Famotidine (PEPCID) 20 Mg Tablet


20 MG PO BID, #14 TAB


   Prov: DYLAN SIERRA MD         19 


Methylprednisolone (MEDROL) 4 Mg Tab.ds.pk


1 PKG PO UD for inflammation, #1 PKG


   Prov: DYLAN SIERRA MD         19 


Hydroxyzine Hcl (HYDROXYZINE HCL) 25 Mg Tablet


1 TAB PO TID PRN for itching, #30 TAB


   Prov: DYLAN SIERRA MD         19











DYLAN SIERRA MD             Aug 18, 2019 06:34

## 2019-09-30 ENCOUNTER — HOSPITAL ENCOUNTER (EMERGENCY)
Dept: HOSPITAL 61 - ER | Age: 49
Discharge: HOME | End: 2019-09-30
Payer: COMMERCIAL

## 2019-09-30 VITALS — SYSTOLIC BLOOD PRESSURE: 99 MMHG | DIASTOLIC BLOOD PRESSURE: 51 MMHG

## 2019-09-30 VITALS — WEIGHT: 180 LBS | BODY MASS INDEX: 28.93 KG/M2 | HEIGHT: 66 IN

## 2019-09-30 DIAGNOSIS — M79.602: ICD-10-CM

## 2019-09-30 DIAGNOSIS — Z88.0: ICD-10-CM

## 2019-09-30 DIAGNOSIS — F41.9: ICD-10-CM

## 2019-09-30 DIAGNOSIS — F32.9: ICD-10-CM

## 2019-09-30 DIAGNOSIS — M79.601: ICD-10-CM

## 2019-09-30 DIAGNOSIS — M54.2: Primary | ICD-10-CM

## 2019-09-30 DIAGNOSIS — Z98.890: ICD-10-CM

## 2019-09-30 PROCEDURE — 96372 THER/PROPH/DIAG INJ SC/IM: CPT

## 2019-09-30 PROCEDURE — 99283 EMERGENCY DEPT VISIT LOW MDM: CPT

## 2019-09-30 NOTE — PHYS DOC
Past Medical History


Past Medical History:  Anxiety, Depression


Past Surgical History:  


Alcohol Use:  None


Drug Use:  None





Adult General


Chief Complaint


Chief Complaint:  UPPER EXTREMITY PAIN





HPI


HPI





Patient is a 49 year old female presents with bilateral arm pain has been 

ongoing since 2 years ago. The patient states she's been struggling with this 

for years. States that it comes and goes.





Review of Systems


Review of Systems





Constitutional: Denies fever or chills []


Eyes: Denies change in visual acuity, redness, or eye pain []


HENT: Denies nasal congestion or sore throat []


Respiratory: Denies cough or shortness of breath []


Cardiovascular: No additional information not addressed in HPI []


GI: Denies abdominal pain, nausea, vomiting, bloody stools or diarrhea []


: Denies dysuria or hematuria []


Musculoskeletal: Reports bilateral arm pain.


Integument: Denies rash or skin lesions []


Neurologic: Denies headache, focal weakness or sensory changes []


Endocrine: Denies polyuria or polydipsia []





Complete systems were reviewed and found to be within normal limits, except as 

documented in this note.





Allergies


Allergies





Allergies








Coded Allergies Type Severity Reaction Last Updated Verified


 


  Penicillins Allergy Intermediate  14 No











Physical Exam


Physical Exam





Constitutional: Well developed, well nourished, no acute distress, non-toxic 

appearance. []


HENT: Normocephalic, atraumatic, bilateral external ears normal, oropharynx 

moist, no oral exudates, nose normal. []


Eyes: PERRLA, EOMI, conjunctiva normal, no discharge. [] 


Neck: Normal range of motion, bilateral neck trigger points, supple, no stridor.

 [] 


Cardiovascular:Heart rate regular rhythm, no murmur []


Lungs & Thorax:  Bilateral breath sounds clear to auscultation []


Abdomen: Bowel sounds normal, soft, no tenderness, no masses, no pulsatile 

masses. [] 


Skin: Warm, dry, no erythema, no rash. [] 


Back: No tenderness, no CVA tenderness. [] 


Extremities: No tenderness, no cyanosis, no clubbing, ROM intact, no edema. [] 


Neurologic: Alert and oriented X 3, normal motor function, normal sensory 

function, no focal deficits noted. []


Psychologic: Affect normal, judgement normal, mood normal. []





Current Patient Data


Vital Signs





                                   Vital Signs








  Date Time  Temp Pulse Resp B/P (MAP) Pulse Ox O2 Delivery O2 Flow Rate FiO2


 


19 15:37 97.9 77 16 99/51 (67) 99 Room Air  





 97.9       











EKG


EKG


[]





Radiology/Procedures


Radiology/Procedures


[]





Course & Med Decision Making


Course & Med Decision Making


Pertinent Labs and Imaging studies reviewed. (See chart for details)





Will give norflex in ER and discharge home.





Dragon Disclaimer


Dragon Disclaimer


This electronic medical record was generated, in whole or in part, using a voice

 recognition dictation system.





Departure


Departure


Impression:  


   Primary Impression:  


   Musculoskeletal neck pain


Disposition:   HOME, SELF-CARE


Condition:  STABLE


Referrals:  


ANNE BLUNT (PCP)


Patient Instructions:  Musculoskeletal Pain





Additional Instructions:  


Thank you for visiting [facility]. We appreciate you trusting us 

with your care. If any additional problems come up don't hesitate to return to 

visit us. Please follow up with your primary care provider so they can plan 

additional care if needed and know about the problem that you had. If symptoms 

worsen come back to the Emergency Department. Any concerning symptoms that start

 such as chest pain, shortness of air, weakness or numbness on one side of the 

body, running high fevers or any other concerning symptoms return to the ER.





Please fill your medications at any pharmacy and follow the prescription 

instructions.


Scripts


Orphenadrine Citrate (ORPHENADRINE CITRATE) 100 Mg Tablet.er


1 TAB PO BID PRN for MUSCLE PAIN, #15 TAB


   Prov: FRANSISCA KIMBLE         19











FRANSISCA KIMBLE          Sep 30, 2019 15:52